# Patient Record
(demographics unavailable — no encounter records)

---

## 2025-06-18 NOTE — ASSESSMENT
[FreeTextEntry1] : 47-year-old woman who is referred for cardiovascular evaluation due to an abnormal EKG with a right bundle left posterior hemiblock.   Abnormal EKG: - 2-week extended holter monitor negative for arrhythmias. predominant NSR.  - Avg HR 90bpm, day HR histograms are elevated (pt reports busy daytime schedule) - pt did not have echo yet, will schedule today  Palpitations: - pt reports brief, rare palpitations in evenings, not impacting QOL - rare ectopy noted on holter - advised pt to consider adding Mg Glycinate at HS  I will call pt with echo results  F/u in 1 yr for EKG evaluation

## 2025-06-18 NOTE — PHYSICAL EXAM
[Normal S1, S2] : normal S1, S2 [Murmur] : murmur [No Edema] : no edema [Normal] : alert and oriented, normal memory [de-identified] : II/VI olamide

## 2025-06-18 NOTE — CARDIOLOGY SUMMARY
[de-identified] : 4/4/25 : Sinus 95 bpm RBBB Possible RVH Right axis [de-identified] : 4/4-4/18/25: min HR of 55, max 159bpm, avg HR of 90bpm. Predominant NSR with BBB/IVCD. Rare ectopy.

## 2025-06-18 NOTE — REVIEW OF SYSTEMS
[SOB] : no shortness of breath [Dyspnea on exertion] : not dyspnea during exertion [Chest Discomfort] : no chest discomfort [Lower Ext Edema] : no extremity edema [Palpitations] : palpitations [Orthopnea] : no orthopnea [PND] : no PND [Syncope] : no syncope [Negative] : Psychiatric

## 2025-06-18 NOTE — HISTORY OF PRESENT ILLNESS
[FreeTextEntry1] : Mrs. 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 She wore a 2-week holter monitor which did not reveal any arrhythmias 4/2025. She reports intermittent, brief (~15s) palpitations, usually in the evening. She is a busy mom of 6 children.  DWEEY LANDAVERDE denies chest pain, chest pressure, shortness of breath, lightheadedness, dizziness, palpitations, syncope, presyncope, orthopnea, PND, or edema.